# Patient Record
(demographics unavailable — no encounter records)

---

## 2017-08-14 NOTE — PHYS DOC
Past Medical History


Past Medical History:  Seizure


Past Surgical History:  No Surgical History





Adult General


Chief Complaint


Chief Complaint:  LACERATION/AVULSION





HPI


HPI





Patient is a 39  year old male presents to the emergency department with 

family. Daughter is the only English speaking person. The wrist examination. 

Patient states that he dropped a bar on his right forearm he has an 

approximately 2 cm laceration. No bleeding at the site noted. Patient does have 

full range of motion of the lower extremity.





Review of Systems


Review of Systems





Constitutional: Denies fever or chills []


Eyes: Denies change in visual acuity, redness, or eye pain []


HENT: Denies nasal congestion or sore throat []


Respiratory: Denies cough or shortness of breath []


Cardiovascular: No additional information not addressed in HPI []


GI: Denies abdominal pain, nausea, vomiting, bloody stools or diarrhea []


: Denies dysuria or hematuria []


Musculoskeletal: Denies back pain or joint pain []


Integument: Denies rash or skin lesions. Left forearm laceration.


Neurologic: Denies headache, focal weakness or sensory changes []


Endocrine: Denies polyuria or polydipsia []





Current Medications


Current Medications





Current Medications








 Medications


  (Trade)  Dose


 Ordered  Sig/Megha  Start Time


 Stop Time Status Last Admin


Dose Admin


 


 Diphtheria/


 Tetanus/Acell


 Pertussis


  (Boostrix)  0.5 ml  ONCE ONCE  8/14/17 22:00


 8/14/17 22:01 DC 8/14/17 22:10


0.5 ML


 


 Lidocaine/Sodium


 Bicarbonate


  (Buffered


 Lidocaine 1%)  20 ml  1X  ONCE  8/14/17 22:00


 8/14/17 22:01 DC 8/14/17 22:10


20 ML











Allergies


Allergies





Allergies








Coded Allergies Type Severity Reaction Last Updated Verified


 


  No Known Drug Allergies    8/14/17 No











Physical Exam


Physical Exam





Constitutional: Well developed, well nourished, no acute distress, non-toxic 

appearance. []


HENT: Normocephalic, atraumatic, bilateral external ears normal, oropharynx 

moist, no oral exudates, nose normal. []


Eyes: PERRLA, EOMI, conjunctiva normal, no discharge. [] 


Neck: Normal range of motion, no tenderness, supple, no stridor. [] 


Cardiovascular:Heart rate regular rhythm


Lungs & Thorax: No respiratory distress noted


Skin: Warm, dry, no erythema, no rash. Patient with a 2 cm laceration noted to 

the left forearm. Bleeding is currently controlled.


Back: No tenderness


Extremities: No tenderness, no cyanosis, no clubbing, ROM intact, no edema. [] 


Neurologic: Alert and oriented X 3, normal motor function, normal sensory 

function, no focal deficits noted. []


Psychologic: Affect normal, judgement normal, mood normal. []





Current Patient Data


Vital Signs





 Vital Signs








  Date Time  Temp Pulse Resp B/P (MAP) Pulse Ox O2 Delivery O2 Flow Rate FiO2


 


8/14/17 21:40 97.4 84 16  98 Room Air  





 97.4       











EKG


EKG


[]





Radiology/Procedures


Radiology/Procedures


[]





Course & Med Decision Making


Course & Med Decision Making


Pertinent Labs and Imaging studies reviewed. (See chart for details)


Patient will be discharged home in stable condition. Recommended keeping the 

area clean and dry. Clean the site twice daily with soap and water and apply 

antibiotic ointment. Watch for signs and symptoms of infection: Redness, warmth

, tenderness or any yellow/greenish drainage of a come from the site physician 

occur follow-up through primary care physician immediately. Otherwise sutures 

out in the next 7-10 days. Tylenol or ibuprofen for pain and discomfort ice 

packs on 20 minutes off 20 minutes several times a day. Patient will be 

discharged home in stable condition since symptoms to return back to emergency 

department as been provided. All questions and concerns was inserted to the 

 line.


[]





Dragon Disclaimer


Dragon Disclaimer


This electronic medical record was generated, in whole or in part, using a 

voice recognition dictation system.





Departure


Departure


Impression:  


 Primary Impression:  


 Laceration


Disposition:  01 HOME, SELF-CARE


Condition:  STABLE


Patient Instructions:  Laceration Care, Adult, Sutured Wound Care, Easy-to-Read





Additional Instructions:  


Activity as tolerated.


Tylenol or ibuprofen for pain and discomfort.


Keep the area clean and dry.


Clean the site twice daily with soap and water and apply antibiotic ointment to 

the area.


Watch for signs and symptoms of infection: Redness, warmth, tenderness or any 

yellow/greenish drainage of a come from the site physician occur follow-up to 

primary care physician immediately.


Follow-up with a primary care physician next 7-10 days to the sutures removed.


Return back to emergency prior signs symptoms of become worse.





Laceration/Wound Repair


Laceration/Wound Repair :  


   Wound Location:  upper extremity


   Wound's Depth, Shape:  superficial


   Wound Length (cm):  3


   Wound Explored:  clean


   Irrigated w/ Saline (ccs):  100


   Betadine Prep?:  Yes


   Anesthesia:  1% Lidocaine


   Volume Anesthetic (ccs):  4


   Wound Debrided:  minimal


   Wound Repaired With:  sutures


   Suture Size/Type:  3:0, nylon


   Number of Sutures:  3


Progress


Laceration was explored for any foreign bodies with no foreign bodies noted. 

Patient tolerated procedure well.











GAY ALONSO Aug 14, 2017 21:29

## 2017-08-21 NOTE — PHYS DOC
Past Medical History


Past Medical History:  Seizure


Additional Past Medical Histor:  LAST SEIZURE "YEARS AGO"


Past Surgical History:  No Surgical History


Alcohol Use:  Heavy


Drug Use:  None





Adult General


Chief Complaint


Chief Complaint:  SUTURE/STAPLE REMOVAL





HPI


HPI





Patient is a 39  year old male who presents for suture removal from the left 

forearm. Patient states the sutures have been in for 1 week.





Patient is Amharic-speaking and the doctor is doing the interpretation.





Review of Systems


Review of Systems





Constitutional: Denies fever or chills []


Musculoskeletal: Denies back pain or joint pain []


Integument: Suture removal from the left forearm


Neurologic: Denies headache, focal weakness or sensory changes []





Allergies


Allergies





Allergies








Coded Allergies Type Severity Reaction Last Updated Verified


 


  No Known Drug Allergies    8/14/17 No











Physical Exam


Physical Exam





Constitutional: Well developed, well nourished, no acute distress, non-toxic 

appearance. []


Skin: Left forearm with 3 interrupted sutures. Laceration site is well 

approximated. No signs of infection.


Back: No tenderness, no CVA tenderness. [] 


Extremities: No tenderness, no cyanosis, no clubbing, ROM intact, no edema. [] 


Neurologic: Alert and oriented X 3, normal motor function, normal sensory 

function, no focal deficits noted. []


Psychologic: Affect normal, judgement normal, mood normal. []





Current Patient Data


Vital Signs





 Vital Signs








  Date Time  Temp Pulse Resp B/P (MAP) Pulse Ox O2 Delivery O2 Flow Rate FiO2


 


8/21/17 19:01 98.6 83 18  95 Room Air  





 98.6       











EKG


EKG


[]





Radiology/Procedures


Radiology/Procedures


[]





Course & Med Decision Making


Course & Med Decision Making


Pertinent Labs and Imaging studies reviewed. (See chart for details)





Patient is in the ED for suture removal. Three interrupted sutures were removed 

from the laceration site by me. Laceration site is well approximated, no signs 

of infection. Follow-up with primary care doctor in 1-2 weeks as needed.





Dragon Disclaimer


Dragon Disclaimer


This electronic medical record was generated, in whole or in part, using a 

voice recognition dictation system.





Departure


Departure


Impression:  


 Primary Impression:  


 Visit for suture removal


Disposition:  01 HOME, SELF-CARE


Condition:  STABLE


Referrals:  


NO PCP (PCP)


follow up as needed


Patient Instructions:  Suture Removal





Additional Instructions:  


You had stitches removed from the left forearm. Keep the area clean and dry. 

Follow-up with your doctor in 1-2 weeks as needed











KATHY OROURKE Aug 21, 2017 19:11